# Patient Record
Sex: FEMALE | Race: WHITE
[De-identification: names, ages, dates, MRNs, and addresses within clinical notes are randomized per-mention and may not be internally consistent; named-entity substitution may affect disease eponyms.]

---

## 2020-03-23 ENCOUNTER — HOSPITAL ENCOUNTER (OUTPATIENT)
Dept: HOSPITAL 92 - L&D/OP | Age: 35
End: 2020-03-23
Attending: OBSTETRICS & GYNECOLOGY
Payer: COMMERCIAL

## 2020-03-23 VITALS — BODY MASS INDEX: 35.8 KG/M2

## 2020-03-23 DIAGNOSIS — Z91.018: ICD-10-CM

## 2020-03-23 DIAGNOSIS — Z3A.38: ICD-10-CM

## 2020-03-23 DIAGNOSIS — O47.1: Primary | ICD-10-CM

## 2020-03-23 PROCEDURE — 99282 EMERGENCY DEPT VISIT SF MDM: CPT

## 2020-03-23 NOTE — PDOC.LDHP
Labor and Delivery H&P


Chief complaint: contractions


HPI: 





36 y/o  at 38w2d, patient of Dr. Post, presents with ctx since last night.

  She reports ctx started at 11pm, got stronger this morning around 5 but have 

spaced out since she got here.  Denies VB, LOF, or decreased FM.





ROS neg for HEENT, cv, pulm, gi, gu, neuro, psych, skin, musculoskeletal or 

constitutional symptoms other than mentioned above.


OB History Details: 





3 prior term SVDs


3 SAB requiring D&Cs


Current pregnancy complications: none


Past Medical History: 





None


Current medications: none


Previous surgical history: none


Allergies/Adverse Reactions: 


 Allergies











Allergy/AdvReac Type Severity Reaction Status Date / Time


 


coconut Allergy  Rash Verified 20 06:39











Social history: none





- Physical Exam


Vital signs reviewed and normal: yes


General: NAD, resting


Lungs: nonlabored breathing


Abdomen: gravid


Extremeties: no edema


FHT: category 1 (120s, mod variability, + accels, no decels)


[place] contractions every: 2-8 mins





- Vaginal Exam


cm dilated: 1


Effacement: 50%


Station: -3





- Assessment





36 y/o  at 38w2d with no e/o active labor.  Fetal status reassuring with 

reactive NST.





- Plan


-: 





D/c home with precautions.  Advised to keep all prenatal appointments.

## 2020-03-31 ENCOUNTER — HOSPITAL ENCOUNTER (INPATIENT)
Dept: HOSPITAL 92 - L&D | Age: 35
LOS: 1 days | Discharge: HOME | End: 2020-04-01
Attending: OBSTETRICS & GYNECOLOGY | Admitting: OBSTETRICS & GYNECOLOGY
Payer: COMMERCIAL

## 2020-03-31 VITALS — BODY MASS INDEX: 35.8 KG/M2

## 2020-03-31 DIAGNOSIS — Z3A.39: ICD-10-CM

## 2020-03-31 LAB
HBSAG INDEX: 0.24 S/CO (ref 0–0.99)
HGB BLD-MCNC: 11.6 G/DL (ref 12–16)
MCH RBC QN AUTO: 28.8 PG (ref 27–31)
MCV RBC AUTO: 84.8 FL (ref 78–98)
PLATELET # BLD AUTO: 208 THOU/UL (ref 130–400)
RBC # BLD AUTO: 4.04 MILL/UL (ref 4.2–5.4)
SYPHILIS ANTIBODY INDEX: 0.06 S/CO
WBC # BLD AUTO: 6.3 THOU/UL (ref 4.8–10.8)

## 2020-03-31 PROCEDURE — 86850 RBC ANTIBODY SCREEN: CPT

## 2020-03-31 PROCEDURE — 86901 BLOOD TYPING SEROLOGIC RH(D): CPT

## 2020-03-31 PROCEDURE — 10907ZC DRAINAGE OF AMNIOTIC FLUID, THERAPEUTIC FROM PRODUCTS OF CONCEPTION, VIA NATURAL OR ARTIFICIAL OPENING: ICD-10-PCS | Performed by: OBSTETRICS & GYNECOLOGY

## 2020-03-31 PROCEDURE — 51702 INSERT TEMP BLADDER CATH: CPT

## 2020-03-31 PROCEDURE — 85027 COMPLETE CBC AUTOMATED: CPT

## 2020-03-31 PROCEDURE — 86900 BLOOD TYPING SEROLOGIC ABO: CPT

## 2020-03-31 PROCEDURE — S0020 INJECTION, BUPIVICAINE HYDRO: HCPCS

## 2020-03-31 PROCEDURE — 3E033VJ INTRODUCTION OF OTHER HORMONE INTO PERIPHERAL VEIN, PERCUTANEOUS APPROACH: ICD-10-PCS | Performed by: OBSTETRICS & GYNECOLOGY

## 2020-03-31 PROCEDURE — 87340 HEPATITIS B SURFACE AG IA: CPT

## 2020-03-31 PROCEDURE — 86780 TREPONEMA PALLIDUM: CPT

## 2020-03-31 PROCEDURE — 36415 COLL VENOUS BLD VENIPUNCTURE: CPT

## 2020-03-31 RX ADMIN — DOCUSATE CALCIUM SCH MG: 240 CAPSULE, LIQUID FILLED ORAL at 22:34

## 2020-04-01 VITALS — DIASTOLIC BLOOD PRESSURE: 77 MMHG | SYSTOLIC BLOOD PRESSURE: 122 MMHG | TEMPERATURE: 98.5 F

## 2020-04-01 LAB
HGB BLD-MCNC: 9.3 G/DL (ref 12–16)
MCH RBC QN AUTO: 28.6 PG (ref 27–31)
MCV RBC AUTO: 85.5 FL (ref 78–98)
PLATELET # BLD AUTO: 187 THOU/UL (ref 130–400)
RBC # BLD AUTO: 3.24 MILL/UL (ref 4.2–5.4)
WBC # BLD AUTO: 9.8 THOU/UL (ref 4.8–10.8)

## 2020-04-01 RX ADMIN — DOCUSATE CALCIUM SCH MG: 240 CAPSULE, LIQUID FILLED ORAL at 08:54
